# Patient Record
Sex: FEMALE | Race: OTHER
[De-identification: names, ages, dates, MRNs, and addresses within clinical notes are randomized per-mention and may not be internally consistent; named-entity substitution may affect disease eponyms.]

---

## 2017-03-28 ENCOUNTER — HOSPITAL ENCOUNTER (INPATIENT)
Dept: HOSPITAL 61 - 3 SO LND | Age: 37
LOS: 2 days | Discharge: HOME | End: 2017-03-30
Attending: SPECIALIST | Admitting: SPECIALIST
Payer: SELF-PAY

## 2017-03-28 VITALS — DIASTOLIC BLOOD PRESSURE: 77 MMHG | SYSTOLIC BLOOD PRESSURE: 122 MMHG

## 2017-03-28 VITALS — WEIGHT: 164 LBS | BODY MASS INDEX: 35.38 KG/M2 | HEIGHT: 57 IN

## 2017-03-28 VITALS — DIASTOLIC BLOOD PRESSURE: 73 MMHG | SYSTOLIC BLOOD PRESSURE: 117 MMHG

## 2017-03-28 DIAGNOSIS — Z3A.00: ICD-10-CM

## 2017-03-28 LAB
ERYTHROCYTE [DISTWIDTH] IN BLOOD BY AUTOMATED COUNT: 14.2 % (ref 11.5–14.5)
HCT VFR BLD CALC: 31.9 % (ref 36–47)
HGB BLD-MCNC: 10.5 G/DL (ref 12–15.5)
MCH RBC QN AUTO: 29 PG (ref 25–35)
MCHC RBC AUTO-ENTMCNC: 33 G/DL (ref 31–37)
MCV RBC AUTO: 89 FL (ref 79–100)
PLATELET # BLD AUTO: 241 X10^3/UL (ref 140–400)
RBC # BLD AUTO: 3.58 X10^6/UL (ref 3.5–5.4)
WBC # BLD AUTO: 7.8 X10^3/UL (ref 4–11)

## 2017-03-28 PROCEDURE — 85014 HEMATOCRIT: CPT

## 2017-03-28 PROCEDURE — 36415 COLL VENOUS BLD VENIPUNCTURE: CPT

## 2017-03-28 PROCEDURE — 86920 COMPATIBILITY TEST SPIN: CPT

## 2017-03-28 PROCEDURE — 86593 SYPHILIS TEST NON-TREP QUANT: CPT

## 2017-03-28 PROCEDURE — 86901 BLOOD TYPING SEROLOGIC RH(D): CPT

## 2017-03-28 PROCEDURE — 86850 RBC ANTIBODY SCREEN: CPT

## 2017-03-28 PROCEDURE — G0378 HOSPITAL OBSERVATION PER HR: HCPCS

## 2017-03-28 PROCEDURE — 86900 BLOOD TYPING SEROLOGIC ABO: CPT

## 2017-03-28 PROCEDURE — 85027 COMPLETE CBC AUTOMATED: CPT

## 2017-03-28 NOTE — PDOC
VAGINAL DELIVERY


DATE


DATE: 3/28/17 


TIME: 20:03


:  3


Para:  2


EDC:  2017


VAGINAL DELIVERY:  VTX


VACCUM ASSISTED:  Yes


NUMBER OF PULLS


2


PLACENTA:  Spontaneous


APGAR





SEX:  Female


WEIGHT


7/8


Amniotic Fluid:  Clear


PAIN:  Local


EPISIOTOMY:  No


EXTENSION:  Yes


COMPLICATIONS


None


CONDITION


Stable


Signs of Intrauterine Infectio:  None


Shoulder Dystocia:  No


DIAGNOSIS


TIUP del


Problems:  








MUSHTAQ POTTS MD Mar 28, 2017 20:05

## 2017-03-29 VITALS — SYSTOLIC BLOOD PRESSURE: 102 MMHG | DIASTOLIC BLOOD PRESSURE: 63 MMHG

## 2017-03-29 VITALS — SYSTOLIC BLOOD PRESSURE: 97 MMHG | DIASTOLIC BLOOD PRESSURE: 58 MMHG

## 2017-03-29 VITALS — DIASTOLIC BLOOD PRESSURE: 72 MMHG | SYSTOLIC BLOOD PRESSURE: 115 MMHG

## 2017-03-29 VITALS — SYSTOLIC BLOOD PRESSURE: 103 MMHG | DIASTOLIC BLOOD PRESSURE: 68 MMHG

## 2017-03-29 VITALS — SYSTOLIC BLOOD PRESSURE: 123 MMHG | DIASTOLIC BLOOD PRESSURE: 74 MMHG

## 2017-03-29 RX ADMIN — IBUPROFEN PRN MG: 800 TABLET ORAL at 17:33

## 2017-03-29 RX ADMIN — FERROUS SULFATE TAB 325 MG (65 MG ELEMENTAL FE) SCH MG: 325 (65 FE) TAB at 17:32

## 2017-03-29 RX ADMIN — IBUPROFEN PRN MG: 800 TABLET ORAL at 08:43

## 2017-03-29 RX ADMIN — DOCUSATE SODIUM PRN MG: 100 CAPSULE, LIQUID FILLED ORAL at 08:42

## 2017-03-29 RX ADMIN — FERROUS SULFATE TAB 325 MG (65 MG ELEMENTAL FE) SCH MG: 325 (65 FE) TAB at 08:42

## 2017-03-29 RX ADMIN — HYDROCODONE BITARTRATE AND ACETAMINOPHEN PRN TAB: 5; 325 TABLET ORAL at 13:34

## 2017-03-29 RX ADMIN — HYDROCODONE BITARTRATE AND ACETAMINOPHEN PRN TAB: 5; 325 TABLET ORAL at 00:22

## 2017-03-29 NOTE — PDOC
OB Progress Note


Date of Service


3/29/17


Time of Evaluation





Notes


Pt. feeling well.  Lochia minimal.  Pain controlled.


Lab





Laboratory Tests








Test


  3/28/17


17:00 3/29/17


03:55


 


White Blood Count


  7.8x10^3/uL


(4.0-11.0) 


 


 


Red Blood Count


  3.58x10^6/uL


(3.50-5.40) 


 


 


Hemoglobin


  10.5g/dL


(12.0-15.5) 


 


 


Hematocrit


  31.9%


(36.0-47.0) 29.5%


(36.0-47.0)


 


Mean Corpuscular Volume 89fL ()  


 


Mean Corpuscular Hemoglobin 29pg (25-35)  


 


Mean Corpuscular Hemoglobin


Concent 33g/dL (31-37) 


  


 


 


Red Cell Distribution Width


  14.2%


(11.5-14.5) 


 


 


Platelet Count


  241x10^3/uL


(140-400) 


 


 


RPR Titer Additional Testing


  Non reactive


(Non Reactive) 


 








Laboratory Tests








Test


  3/29/17


03:55


 


Hematocrit


  29.5%


(36.0-47.0)








Medications





 Current Medications


Sodium Chloride 3 ml 3 ml QSHIFT  PRN IV AFTER MEDS AND BLOOD DRAWS;  Start 3/28

/17 at 17:30;  Stop 3/28/17 at 23:03;  Status DC


Lactated Ringer's (Iv Lactated Ringers) 1,000 ml @  125 mls/hr Q8H IV  Last 

administered on 3/28/17at 18:23;  Start 3/28/17 at 18:00;  Stop 3/28/17 at 23:03

;  Status DC


Butorphanol Tartrate (Stadol) 2 mg PRN Q1HR  PRN IV Severe labor pain;  Start 3/

28/17 at 17:30;  Stop 3/28/17 at 23:03;  Status DC


Fentanyl Citrate (Fentanyl 2ml Vial) 100 mcg PRN Q30MIN  PRN IV Severe pain 

Last administered on 3/28/17at 18:22;  Start 3/28/17 at 17:30;  Stop 3/28/17 at 

23:03;  Status DC


Ondansetron HCl (Zofran) 4 mg PRN Q4HRS  PRN IV NAUSEA/VOMITING;  Start 3/28/17 

at 17:30


Terbutaline Sulfate (Brethine) 0.25 mg 1X PRN  PRN SQ SEE COMMENTS;  Start 3/28/

17 at 17:30;  Stop 3/28/17 at 23:03;  Status DC


Lidocaine HCl 30 ml 30 ml 1X PRN  PRN INJ SEE COMMENTS Last administered on 3/28

/17at 20:00;  Start 3/28/17 at 17:30;  Stop 3/28/17 at 23:03;  Status DC


Oxytocin/Sodium Chloride 500 ml @ 0 mls/hr CONT  PRN IV SEE I/O RECORD Last 

administered on 3/28/17at 18:23;  Start 3/28/17 at 17:30;  Stop 3/28/17 at 23:03

;  Status DC


Oxytocin/Sodium Chloride (Oxytocin Premix Infusion) 500 ml @ 0 mls/hr CONT PRN  

PRN IV Post delivery bleeding;  Start 3/28/17 at 17:30;  Stop 3/28/17 at 23:03;

  Status DC


Ibuprofen (Motrin) 800 mg PRN Q6HRS  PRN PO PAIN Last administered on 3/28/17at 

21:19;  Start 3/28/17 at 17:30;  Stop 3/28/17 at 23:03;  Status DC


Sodium Chloride 10 ml 10 ml QSHIFT  PRN IV AFTER MEDS AND BLOOD DRAWS;  Start 3/

28/17 at 23:00


Oxytocin/Sodium Chloride (Oxytocin Premix Infusion) 500 ml @  62.5 mls/hr CONT  

PRN IV SEE I/O RECORD;  Start 3/28/17 at 23:00;  Stop 3/29/17 at 06:59;  Status 

DC


Acetaminophen (Tylenol) 650 mg PRN Q6HRS  PRN PO MILD PAIN / TEMP;  Start 3/28/

17 at 23:00


Ibuprofen (Motrin) 800 mg PRN Q8HRS  PRN PO INFLAMMATION/PAIN PREVENTION Last 

administered on 3/29/17at 17:33;  Start 3/28/17 at 23:00


Docusate Sodium (Colace) 100 mg PRN BID  PRN PO CONSTIPATION Last administered 

on 3/29/17at 08:42;  Start 3/28/17 at 23:00


Magnesium Hydroxide (Milk Of Magnesia) 2,400 mg PRN DAILY  PRN PO CONSTIPATION;

  Start 3/28/17 at 23:00


Al Hydroxide/Mg Hydroxide (Mylanta Plus Xs) 30 ml PRN Q4HRS  PRN PO HEARTBURN / 

GAS;  Start 3/28/17 at 23:00


Simethicone (Gas-X) 80 mg PRN AFTMEALHC  PRN PO GAS / BLOATING;  Start 3/28/17 

at 23:00


Diphenhydramine HCl (Benadryl) 25 mg PRN Q6HRS  PRN PO ITCHING;  Start 3/28/17 

at 23:00


Benzocaine (Americaine) 1 spray PRN QID  PRN TP TOPICAL PAIN Last administered 

on 3/29/17at 00:17;  Start 3/28/17 at 23:00


Phenyleph/Shark Oil/Min Oil/Petrol (Preparation H) 1 arnoldo PRN QID  PRN RC RECTAL 

PAIN;  Start 3/28/17 at 23:00


Hydrocortisone (Cortaid) 1 arnoldo PRN QID  PRN TP PERINEAL PAIN;  Start 3/28/17 at 

23:00


Ferrous Sulfate (Feosol) 325 mg BIDWMEALS PO  Last administered on 3/29/17at 17:

32;  Start 3/29/17 at 08:00


Zolpidem Tartrate (Ambien) 5 mg PRN QHS  PRN PO INSOMNIA, MAY REPEAT X1;  Start 

3/28/17 at 23:00


Info (Do NOT chart on this placeholder) 1 ea 1X PRN  PRN MC SEE COMMENTS;  

Start 3/28/17 at 23:00


Info (Do NOT chart on this placeholder) 1 ea 1X PRN  PRN MC SEE COMMENTS;  

Start 3/28/17 at 23:00


Acetaminophen/ Hydrocodone Bitart (Lortab 5/325) 1 tab PRN Q4HRS  PRN PO MILD 

PAIN;  Start 3/28/17 at 23:00


Acetaminophen/ Hydrocodone Bitart (Lortab 5/325) 2 tab PRN Q4HRS  PRN PO 

MODERATE PAIN, SEVERE PAIN Last administered on 3/29/17at 13:34;  Start 3/28/17 

at 23:00





Active Scripts


Active


Reported


Prenatal Tablet (Prenatal Vit/Iron Fumarate/Fa) 1 Each Tablet 1 Each PO DAILY


Exam


Abd: soft, non tender, fundus firm


Assessment


PPD#1 s/p 


Plan of Care:  Continue current Tx, Mgmt








DEEPA SALEH Jr, MD Mar 29, 2017 18:16

## 2017-03-30 VITALS — DIASTOLIC BLOOD PRESSURE: 74 MMHG | SYSTOLIC BLOOD PRESSURE: 112 MMHG

## 2017-03-30 VITALS
DIASTOLIC BLOOD PRESSURE: 74 MMHG | SYSTOLIC BLOOD PRESSURE: 112 MMHG | SYSTOLIC BLOOD PRESSURE: 112 MMHG | DIASTOLIC BLOOD PRESSURE: 74 MMHG

## 2017-03-30 VITALS — DIASTOLIC BLOOD PRESSURE: 69 MMHG | SYSTOLIC BLOOD PRESSURE: 111 MMHG

## 2017-03-30 VITALS — DIASTOLIC BLOOD PRESSURE: 66 MMHG | SYSTOLIC BLOOD PRESSURE: 104 MMHG

## 2017-03-30 RX ADMIN — DOCUSATE SODIUM PRN MG: 100 CAPSULE, LIQUID FILLED ORAL at 08:23

## 2017-03-30 RX ADMIN — HYDROCODONE BITARTRATE AND ACETAMINOPHEN PRN TAB: 5; 325 TABLET ORAL at 08:24

## 2017-03-30 RX ADMIN — IBUPROFEN PRN MG: 800 TABLET ORAL at 06:33

## 2017-03-30 RX ADMIN — FERROUS SULFATE TAB 325 MG (65 MG ELEMENTAL FE) SCH MG: 325 (65 FE) TAB at 08:23

## 2017-03-30 NOTE — PDOC
OB Progress Note


Date of Service


3/30/17


Time of Evaluation


1730


Notes


Pt. feeling well. No complaints.


Lab





Laboratory Tests








Test


  3/29/17


03:55


 


Hematocrit


  29.5%


(36.0-47.0)








Medications





 Current Medications


Sodium Chloride 3 ml 3 ml QSHIFT  PRN IV AFTER MEDS AND BLOOD DRAWS;  Start 3/28

/17 at 17:30;  Stop 3/28/17 at 23:03;  Status DC


Lactated Ringer's (Iv Lactated Ringers) 1,000 ml @  125 mls/hr Q8H IV  Last 

administered on 3/28/17at 18:23;  Start 3/28/17 at 18:00;  Stop 3/28/17 at 23:03

;  Status DC


Butorphanol Tartrate (Stadol) 2 mg PRN Q1HR  PRN IV Severe labor pain;  Start 3/

28/17 at 17:30;  Stop 3/28/17 at 23:03;  Status DC


Fentanyl Citrate (Fentanyl 2ml Vial) 100 mcg PRN Q30MIN  PRN IV Severe pain 

Last administered on 3/28/17at 18:22;  Start 3/28/17 at 17:30;  Stop 3/28/17 at 

23:03;  Status DC


Ondansetron HCl (Zofran) 4 mg PRN Q4HRS  PRN IV NAUSEA/VOMITING;  Start 3/28/17 

at 17:30;  Stop 3/30/17 at 05:00;  Status DC


Terbutaline Sulfate (Brethine) 0.25 mg 1X PRN  PRN SQ SEE COMMENTS;  Start 3/28/

17 at 17:30;  Stop 3/28/17 at 23:03;  Status DC


Lidocaine HCl 30 ml 30 ml 1X PRN  PRN INJ SEE COMMENTS Last administered on 3/28

/17at 20:00;  Start 3/28/17 at 17:30;  Stop 3/28/17 at 23:03;  Status DC


Oxytocin/Sodium Chloride 500 ml @ 0 mls/hr CONT  PRN IV SEE I/O RECORD Last 

administered on 3/28/17at 18:23;  Start 3/28/17 at 17:30;  Stop 3/28/17 at 23:03

;  Status DC


Oxytocin/Sodium Chloride (Oxytocin Premix Infusion) 500 ml @ 0 mls/hr CONT PRN  

PRN IV Post delivery bleeding;  Start 3/28/17 at 17:30;  Stop 3/28/17 at 23:03;

  Status DC


Ibuprofen (Motrin) 800 mg PRN Q6HRS  PRN PO PAIN Last administered on 3/28/17at 

21:19;  Start 3/28/17 at 17:30;  Stop 3/28/17 at 23:03;  Status DC


Sodium Chloride 10 ml 10 ml QSHIFT  PRN IV AFTER MEDS AND BLOOD DRAWS;  Start 3/

28/17 at 23:00


Oxytocin/Sodium Chloride (Oxytocin Premix Infusion) 500 ml @  62.5 mls/hr CONT  

PRN IV SEE I/O RECORD;  Start 3/28/17 at 23:00;  Stop 3/29/17 at 06:59;  Status 

DC


Acetaminophen (Tylenol) 650 mg PRN Q6HRS  PRN PO MILD PAIN / TEMP;  Start 3/28/

17 at 23:00;  Stop 3/30/17 at 05:00;  Status DC


Ibuprofen (Motrin) 800 mg PRN Q8HRS  PRN PO INFLAMMATION/PAIN PREVENTION Last 

administered on 3/30/17at 06:33;  Start 3/28/17 at 23:00


Docusate Sodium (Colace) 100 mg PRN BID  PRN PO CONSTIPATION Last administered 

on 3/30/17at 08:23;  Start 3/28/17 at 23:00


Magnesium Hydroxide (Milk Of Magnesia) 2,400 mg PRN DAILY  PRN PO CONSTIPATION;

  Start 3/28/17 at 23:00


Al Hydroxide/Mg Hydroxide (Mylanta Plus Xs) 30 ml PRN Q4HRS  PRN PO HEARTBURN / 

GAS;  Start 3/28/17 at 23:00


Simethicone (Gas-X) 80 mg PRN AFTMEALHC  PRN PO GAS / BLOATING;  Start 3/28/17 

at 23:00


Diphenhydramine HCl (Benadryl) 25 mg PRN Q6HRS  PRN PO ITCHING;  Start 3/28/17 

at 23:00


Benzocaine (Americaine) 1 spray PRN QID  PRN TP TOPICAL PAIN Last administered 

on 3/29/17at 00:17;  Start 3/28/17 at 23:00


Phenyleph/Shark Oil/Min Oil/Petrol (Preparation H) 1 arnoldo PRN QID  PRN RC RECTAL 

PAIN;  Start 3/28/17 at 23:00


Hydrocortisone (Cortaid) 1 arnoldo PRN QID  PRN TP PERINEAL PAIN;  Start 3/28/17 at 

23:00


Ferrous Sulfate (Feosol) 325 mg BIDWMEALS PO  Last administered on 3/30/17at 08:

23;  Start 3/29/17 at 08:00


Zolpidem Tartrate (Ambien) 5 mg PRN QHS  PRN PO INSOMNIA, MAY REPEAT X1;  Start 

3/28/17 at 23:00


Info (Do NOT chart on this placeholder) 1 ea 1X PRN  PRN MC SEE COMMENTS;  

Start 3/28/17 at 23:00


Info (Do NOT chart on this placeholder) 1 ea 1X PRN  PRN MC SEE COMMENTS;  

Start 3/28/17 at 23:00


Acetaminophen/ Hydrocodone Bitart (Lortab 5/325) 1 tab PRN Q4HRS  PRN PO MILD 

PAIN;  Start 3/28/17 at 23:00


Acetaminophen/ Hydrocodone Bitart (Lortab 5/325) 2 tab PRN Q4HRS  PRN PO 

MODERATE PAIN, SEVERE PAIN Last administered on 3/30/17at 08:24;  Start 3/28/17 

at 23:00


Acetaminophen (Tylenol) 650 mg PRN Q6HRS  PRN PO MILD PAIN / TEMP;  Start 3/30/

17 at 05:00


Ondansetron HCl (Zofran) 4 mg PRN Q4HRS  PRN IV NAUSEA/VOMITING;  Start 3/30/17 

at 05:00





Active Scripts


Active


Reported


Prenatal Tablet (Prenatal Vit/Iron Fumarate/Fa) 1 Each Tablet 1 Each PO DAILY


Exam


Abd: soft, non tender, fundus firm


Assessment


PPD#2 s/p 


Plan of Care:  See new orders (D/c home.)








DEEPA SALEH Jr, MD Mar 30, 2017 17:32

## 2017-03-30 NOTE — DISCH
DISCHARGE INSTRUCTIONS


Condition on Discharge


Condition on Discharge:  Stable





Activity After Discharge


Activity Instructions for Disc:  Activity as tolerated


Lifting Instructions after Dis:  No heavy lifting


Driving Instructions after Dis:  Do not drive today





Diet after Discharge


Diet after Discharge:  Regular





Contacting the DRNancy after DC


Call your doctor for:  Concerns you may have





Follow-Up


Follow up with:  Dr. Chambers in 1 week.








DEEPA SALEH Jr, MD Mar 30, 2017 17:32

## 2019-07-11 ENCOUNTER — HOSPITAL ENCOUNTER (OUTPATIENT)
Dept: HOSPITAL 61 - 3 SO LND | Age: 39
Setting detail: OBSERVATION
Discharge: HOME | End: 2019-07-11
Attending: OBSTETRICS & GYNECOLOGY | Admitting: OBSTETRICS & GYNECOLOGY
Payer: SELF-PAY

## 2019-07-11 VITALS — WEIGHT: 165 LBS | BODY MASS INDEX: 33.26 KG/M2 | HEIGHT: 59 IN

## 2019-07-11 DIAGNOSIS — O46.93: Primary | ICD-10-CM

## 2019-07-11 DIAGNOSIS — Z3A.30: ICD-10-CM

## 2019-07-11 LAB
BASOPHILS # BLD AUTO: 0 X10^3/UL (ref 0–0.2)
BASOPHILS NFR BLD: 0 % (ref 0–3)
EOSINOPHIL NFR BLD: 0.1 X10^3/UL (ref 0–0.7)
EOSINOPHIL NFR BLD: 1 % (ref 0–3)
ERYTHROCYTE [DISTWIDTH] IN BLOOD BY AUTOMATED COUNT: 13.9 % (ref 11.5–14.5)
HCT VFR BLD CALC: 38.8 % (ref 36–47)
HGB BLD-MCNC: 13.2 G/DL (ref 12–15.5)
LYMPHOCYTES # BLD: 1.6 X10^3/UL (ref 1–4.8)
LYMPHOCYTES NFR BLD AUTO: 24 % (ref 24–48)
MCH RBC QN AUTO: 32 PG (ref 25–35)
MCHC RBC AUTO-ENTMCNC: 34 G/DL (ref 31–37)
MCV RBC AUTO: 94 FL (ref 79–100)
MONO #: 0.4 X10^3/UL (ref 0–1.1)
MONOCYTES NFR BLD: 7 % (ref 0–9)
NEUT #: 4.5 X10^3/UL (ref 1.8–7.7)
NEUTROPHILS NFR BLD AUTO: 68 % (ref 31–73)
PLATELET # BLD AUTO: 257 X10^3/UL (ref 140–400)
RBC # BLD AUTO: 4.14 X10^6/UL (ref 3.5–5.4)
WBC # BLD AUTO: 6.6 X10^3/UL (ref 4–11)

## 2019-07-11 PROCEDURE — G0378 HOSPITAL OBSERVATION PER HR: HCPCS

## 2019-07-11 PROCEDURE — 86901 BLOOD TYPING SEROLOGIC RH(D): CPT

## 2019-07-11 PROCEDURE — G0379 DIRECT REFER HOSPITAL OBSERV: HCPCS

## 2019-07-11 PROCEDURE — 76805 OB US >/= 14 WKS SNGL FETUS: CPT

## 2019-07-11 PROCEDURE — 86850 RBC ANTIBODY SCREEN: CPT

## 2019-07-11 PROCEDURE — 86900 BLOOD TYPING SEROLOGIC ABO: CPT

## 2019-07-11 PROCEDURE — 36415 COLL VENOUS BLD VENIPUNCTURE: CPT

## 2019-07-11 PROCEDURE — 85025 COMPLETE CBC W/AUTO DIFF WBC: CPT

## 2019-07-11 PROCEDURE — 96372 THER/PROPH/DIAG INJ SC/IM: CPT

## 2019-07-11 NOTE — RAD
OB ultrasound greater than 14 weeks 7/11/2019

 

Clinical History: Third trimester pregnancy with vaginal bleeding.

 

Technique: A real-time ultrasound examination of the gravid uterus was 

performed. Multiple images were obtained.

 

Findings: There is a single living IUP. The fetus is in a cephalic 

position. Fetal cardiac and somatic activity is seen. The fetal heart rate

is 132 beats per minutes. The placenta is anterior. No significant 

abnormality of the placenta is seen. No abnormality is seen. The amniotic 

fluid volume is within the lower limits of normal. The CARMELINA measures 5.4 

cm. The maternal cervix is not well visualized due to the position of the 

fetal head. Neither maternal ovary is visualized.

 

The following fetal measurements were obtained:

 

BPD 8.1cm 32 weeks 3 days

 

HC 28.6 cm 31weeks 3 days

 

AC 27.3 cm 31weeks to days

 

FL 6.0 cm 31 weeks to days

 

The estimated gestational age by ultrasound is 31 weeks 4 days plus or 

minus a standard deviation of 3 weeks. The estimated date of delivery by 

ultrasound is 9/8/2019. The estimated fetal weight is 1761 g +/- 261 g (3 

lbs. 14 oz.)

 

 

No fetal abnormality is seen. Specifically the fetal stomach, bladder, 

kidneys, 3 vessel cord and four-chamber heart are well-visualized and 

within normal limits. The fetal brain is not well evaluated due to fetal 

position.

 

Impression: Single living IUP with an estimated gestational age by 

ultrasound of 31 weeks 4 days +/- a standard deviation of 3 weeks. The 

estimated fetal weight is 1761 g +/- 261 g (3 lbs. 14 oz.)

 

Electronically signed by: Ray Ordaz MD (7/11/2019 11:04 AM) [facility]

## 2019-08-15 ENCOUNTER — HOSPITAL ENCOUNTER (INPATIENT)
Dept: HOSPITAL 61 - 3 SO LND | Age: 39
LOS: 5 days | Discharge: HOME | End: 2019-08-20
Attending: SPECIALIST | Admitting: SPECIALIST
Payer: SELF-PAY

## 2019-08-15 VITALS — DIASTOLIC BLOOD PRESSURE: 70 MMHG | SYSTOLIC BLOOD PRESSURE: 114 MMHG

## 2019-08-15 VITALS — WEIGHT: 167 LBS | BODY MASS INDEX: 33.67 KG/M2 | HEIGHT: 59 IN

## 2019-08-15 DIAGNOSIS — O41.03X0: ICD-10-CM

## 2019-08-15 DIAGNOSIS — O34.211: Primary | ICD-10-CM

## 2019-08-15 DIAGNOSIS — Z3A.36: ICD-10-CM

## 2019-08-15 LAB
ALBUMIN SERPL-MCNC: 2.6 G/DL (ref 3.4–5)
ALBUMIN/GLOB SERPL: 0.6 {RATIO} (ref 1–1.7)
ALP SERPL-CCNC: 134 U/L (ref 46–116)
ALT SERPL-CCNC: 20 U/L (ref 14–59)
ANION GAP SERPL CALC-SCNC: 11 MMOL/L (ref 6–14)
APTT PPP: (no result) S
AST SERPL-CCNC: 19 U/L (ref 15–37)
BACTERIA #/AREA URNS HPF: (no result) /HPF
BASOPHILS # BLD AUTO: 0.1 X10^3/UL (ref 0–0.2)
BASOPHILS NFR BLD: 1 % (ref 0–3)
BILIRUB SERPL-MCNC: 0.4 MG/DL (ref 0.2–1)
BILIRUB UR QL STRIP: NEGATIVE
BUN SERPL-MCNC: 9 MG/DL (ref 7–20)
BUN/CREAT SERPL: 15 (ref 6–20)
CALCIUM SERPL-MCNC: 8.9 MG/DL (ref 8.5–10.1)
CHLORIDE SERPL-SCNC: 103 MMOL/L (ref 98–107)
CO2 SERPL-SCNC: 22 MMOL/L (ref 21–32)
CREAT SERPL-MCNC: 0.6 MG/DL (ref 0.6–1)
EOSINOPHIL NFR BLD: 0 X10^3/UL (ref 0–0.7)
EOSINOPHIL NFR BLD: 1 % (ref 0–3)
ERYTHROCYTE [DISTWIDTH] IN BLOOD BY AUTOMATED COUNT: 13.9 % (ref 11.5–14.5)
FIBRINOGEN PPP-MCNC: CLEAR MG/DL
GFR SERPLBLD BASED ON 1.73 SQ M-ARVRAT: 111.3 ML/MIN
GLOBULIN SER-MCNC: 4.3 G/DL (ref 2.2–3.8)
GLUCOSE SERPL-MCNC: 140 MG/DL (ref 70–99)
HCT VFR BLD CALC: 36.4 % (ref 36–47)
HGB BLD-MCNC: 12.4 G/DL (ref 12–15.5)
LYMPHOCYTES # BLD: 1.6 X10^3/UL (ref 1–4.8)
LYMPHOCYTES NFR BLD AUTO: 21 % (ref 24–48)
MCH RBC QN AUTO: 31 PG (ref 25–35)
MCHC RBC AUTO-ENTMCNC: 34 G/DL (ref 31–37)
MCV RBC AUTO: 93 FL (ref 79–100)
MONO #: 0.4 X10^3/UL (ref 0–1.1)
MONOCYTES NFR BLD: 5 % (ref 0–9)
NEUT #: 5.6 X10^3/UL (ref 1.8–7.7)
NEUTROPHILS NFR BLD AUTO: 72 % (ref 31–73)
NITRITE UR QL STRIP: NEGATIVE
PH UR STRIP: 6 [PH]
PLATELET # BLD AUTO: 229 X10^3/UL (ref 140–400)
POTASSIUM SERPL-SCNC: 3.7 MMOL/L (ref 3.5–5.1)
PROT SERPL-MCNC: 6.9 G/DL (ref 6.4–8.2)
PROT UR STRIP-MCNC: 30 MG/DL
RBC # BLD AUTO: 3.94 X10^6/UL (ref 3.5–5.4)
RBC #/AREA URNS HPF: 0 /HPF (ref 0–2)
SODIUM SERPL-SCNC: 136 MMOL/L (ref 136–145)
SQUAMOUS #/AREA URNS LPF: (no result) /LPF
UROBILINOGEN UR-MCNC: 0.2 MG/DL
WBC # BLD AUTO: 7.8 X10^3/UL (ref 4–11)

## 2019-08-15 PROCEDURE — 82962 GLUCOSE BLOOD TEST: CPT

## 2019-08-15 PROCEDURE — 87086 URINE CULTURE/COLONY COUNT: CPT

## 2019-08-15 PROCEDURE — 86901 BLOOD TYPING SEROLOGIC RH(D): CPT

## 2019-08-15 PROCEDURE — 36415 COLL VENOUS BLD VENIPUNCTURE: CPT

## 2019-08-15 PROCEDURE — 81001 URINALYSIS AUTO W/SCOPE: CPT

## 2019-08-15 PROCEDURE — 86850 RBC ANTIBODY SCREEN: CPT

## 2019-08-15 PROCEDURE — 76816 OB US FOLLOW-UP PER FETUS: CPT

## 2019-08-15 PROCEDURE — 86592 SYPHILIS TEST NON-TREP QUAL: CPT

## 2019-08-15 PROCEDURE — 86900 BLOOD TYPING SEROLOGIC ABO: CPT

## 2019-08-15 PROCEDURE — 80053 COMPREHEN METABOLIC PANEL: CPT

## 2019-08-15 PROCEDURE — 85025 COMPLETE CBC W/AUTO DIFF WBC: CPT

## 2019-08-15 PROCEDURE — G0379 DIRECT REFER HOSPITAL OBSERV: HCPCS

## 2019-08-15 PROCEDURE — 92585: CPT

## 2019-08-15 PROCEDURE — 84443 ASSAY THYROID STIM HORMONE: CPT

## 2019-08-15 PROCEDURE — G0378 HOSPITAL OBSERVATION PER HR: HCPCS

## 2019-08-15 RX ADMIN — BETAMETHASONE SODIUM PHOSPHATE AND BETAMETHASONE ACETATE SCH MG: 3; 3 INJECTION, SUSPENSION INTRA-ARTICULAR; INTRALESIONAL; INTRAMUSCULAR at 13:20

## 2019-08-15 RX ADMIN — SODIUM CHLORIDE, SODIUM LACTATE, POTASSIUM CHLORIDE, AND CALCIUM CHLORIDE SCH MLS/HR: .6; .31; .03; .02 INJECTION, SOLUTION INTRAVENOUS at 17:38

## 2019-08-15 RX ADMIN — SODIUM CHLORIDE, SODIUM LACTATE, POTASSIUM CHLORIDE, AND CALCIUM CHLORIDE SCH MLS/HR: .6; .31; .03; .02 INJECTION, SOLUTION INTRAVENOUS at 13:19

## 2019-08-15 RX ADMIN — SODIUM CHLORIDE, SODIUM LACTATE, POTASSIUM CHLORIDE, AND CALCIUM CHLORIDE SCH MLS/HR: .6; .31; .03; .02 INJECTION, SOLUTION INTRAVENOUS at 20:41

## 2019-08-16 RX ADMIN — BETAMETHASONE SODIUM PHOSPHATE AND BETAMETHASONE ACETATE SCH MG: 3; 3 INJECTION, SUSPENSION INTRA-ARTICULAR; INTRALESIONAL; INTRAMUSCULAR at 13:51

## 2019-08-16 RX ADMIN — SODIUM CHLORIDE, SODIUM LACTATE, POTASSIUM CHLORIDE, AND CALCIUM CHLORIDE SCH MLS/HR: .6; .31; .03; .02 INJECTION, SOLUTION INTRAVENOUS at 02:03

## 2019-08-16 RX ADMIN — SODIUM CHLORIDE, SODIUM LACTATE, POTASSIUM CHLORIDE, AND CALCIUM CHLORIDE SCH MLS/HR: .6; .31; .03; .02 INJECTION, SOLUTION INTRAVENOUS at 08:21

## 2019-08-16 RX ADMIN — SODIUM CHLORIDE, SODIUM LACTATE, POTASSIUM CHLORIDE, AND CALCIUM CHLORIDE SCH MLS/HR: .6; .31; .03; .02 INJECTION, SOLUTION INTRAVENOUS at 20:41

## 2019-08-16 RX ADMIN — SODIUM CHLORIDE, SODIUM LACTATE, POTASSIUM CHLORIDE, AND CALCIUM CHLORIDE SCH MLS/HR: .6; .31; .03; .02 INJECTION, SOLUTION INTRAVENOUS at 04:41

## 2019-08-16 RX ADMIN — SODIUM CHLORIDE, SODIUM LACTATE, POTASSIUM CHLORIDE, AND CALCIUM CHLORIDE SCH MLS/HR: .6; .31; .03; .02 INJECTION, SOLUTION INTRAVENOUS at 12:41

## 2019-08-16 RX ADMIN — SODIUM CHLORIDE, SODIUM LACTATE, POTASSIUM CHLORIDE, AND CALCIUM CHLORIDE SCH MLS/HR: .6; .31; .03; .02 INJECTION, SOLUTION INTRAVENOUS at 18:04

## 2019-08-16 NOTE — PDOC1
OB - History


Hx of Present Pregnancy


Prenatal Care:  Good Care


Ultrasounds:  Normal mid trimester US (oligo), Abnormal US findings


Obstetrical Complications:  Other (oigo)


Medical Complications:  None





Past Family/Social History


*


Past Medical, Surgical, Family and Obstetric Histories reviewed from prenatal 

chart.


Blood Type:  O+


RPR/VDRL:  Negative


GBS Status:  Unknown


HBsAG:  Negative





OB - Chief Complaint & HPI


Date of Admission:


Date of Admission:  Aug 15, 2019 at 12:26


Chief Complaint/History


:  5


Para:  3


EDC:  Sep 14, 2019


Reason for admission:  observation, other (oligo)


Admission Nurse Assessment Rev:  Yes





OB - Admission Exam


Physical Exam


Vitals:





                          VS - Last 72 Hours, by Label








  Date Time  Temp Pulse Resp B/P (MAP) Pulse Ox O2 Delivery O2 Flow Rate FiO2


 


8/15/19 13:29 98.0 78 20 114/70 (85)  Room Air  





 98.0       








HEENT:  Normal, Nasal Mucosa Normal, Oropharynx Normal, Moist Membranes, 

Fontanelles Normal


Heart:  Regular Rate


Lungs:  Clear, Equal


Abdomen:  Gravid


Extremities:  Normal Pulses, No tenderness or swelling


Reflexes:  Normal


Cervical Dilatation:  3cm


Effacement:  75%


Station:  Ballotable


Membranes:  Intact


Fetal Heart Rate:  Normal


Accelerations:  Accelerations Present


Short Term Variability:  Present





Assessment/Plan


Assessment/Plan


TIUP


Oligo


Observation


Continuous Monitoring


Steroids


Recheck CARMELINA


Saturday











MUSHTAQ POTTS MD               Aug 16, 2019 13:06

## 2019-08-16 NOTE — PDOC
Provider Note


Provider Note


Stable


FHT reassuring


occasional Ctx


FU in AM











UMSHTAQ POTTS MD               Aug 16, 2019 13:07

## 2019-08-17 VITALS — DIASTOLIC BLOOD PRESSURE: 62 MMHG | SYSTOLIC BLOOD PRESSURE: 100 MMHG

## 2019-08-17 VITALS — SYSTOLIC BLOOD PRESSURE: 118 MMHG | DIASTOLIC BLOOD PRESSURE: 76 MMHG

## 2019-08-17 LAB
BASOPHILS # BLD AUTO: 0 X10^3/UL (ref 0–0.2)
BASOPHILS NFR BLD: 0 % (ref 0–3)
EOSINOPHIL NFR BLD: 0 % (ref 0–3)
EOSINOPHIL NFR BLD: 0 X10^3/UL (ref 0–0.7)
ERYTHROCYTE [DISTWIDTH] IN BLOOD BY AUTOMATED COUNT: 13.8 % (ref 11.5–14.5)
HCT VFR BLD CALC: 33.2 % (ref 36–47)
HGB BLD-MCNC: 11.2 G/DL (ref 12–15.5)
LYMPHOCYTES # BLD: 1.7 X10^3/UL (ref 1–4.8)
LYMPHOCYTES NFR BLD AUTO: 19 % (ref 24–48)
MCH RBC QN AUTO: 31 PG (ref 25–35)
MCHC RBC AUTO-ENTMCNC: 34 G/DL (ref 31–37)
MCV RBC AUTO: 93 FL (ref 79–100)
MONO #: 0.4 X10^3/UL (ref 0–1.1)
MONOCYTES NFR BLD: 5 % (ref 0–9)
NEUT #: 6.8 X10^3/UL (ref 1.8–7.7)
NEUTROPHILS NFR BLD AUTO: 77 % (ref 31–73)
PLATELET # BLD AUTO: 188 X10^3/UL (ref 140–400)
RBC # BLD AUTO: 3.57 X10^6/UL (ref 3.5–5.4)
WBC # BLD AUTO: 8.9 X10^3/UL (ref 4–11)

## 2019-08-17 RX ADMIN — KETOROLAC TROMETHAMINE PRN MG: 30 INJECTION, SOLUTION INTRAMUSCULAR at 19:35

## 2019-08-17 RX ADMIN — CEFAZOLIN SCH GM: 330 INJECTION, POWDER, FOR SOLUTION INTRAMUSCULAR; INTRAVENOUS at 22:01

## 2019-08-17 RX ADMIN — SODIUM CHLORIDE, SODIUM LACTATE, POTASSIUM CHLORIDE, AND CALCIUM CHLORIDE SCH MLS/HR: .6; .31; .03; .02 INJECTION, SOLUTION INTRAVENOUS at 20:41

## 2019-08-17 RX ADMIN — SODIUM CHLORIDE, SODIUM LACTATE, POTASSIUM CHLORIDE, AND CALCIUM CHLORIDE SCH MLS/HR: .6; .31; .03; .02 INJECTION, SOLUTION INTRAVENOUS at 04:41

## 2019-08-17 RX ADMIN — IBUPROFEN SCH MG: 400 TABLET ORAL at 22:00

## 2019-08-17 RX ADMIN — SODIUM CHLORIDE, SODIUM LACTATE, POTASSIUM CHLORIDE, AND CALCIUM CHLORIDE SCH MLS/HR: .6; .31; .03; .02 INJECTION, SOLUTION INTRAVENOUS at 01:30

## 2019-08-17 RX ADMIN — Medication SCH MG: at 17:00

## 2019-08-17 RX ADMIN — SODIUM CHLORIDE, SODIUM LACTATE, POTASSIUM CHLORIDE, AND CALCIUM CHLORIDE SCH MLS/HR: .6; .31; .03; .02 INJECTION, SOLUTION INTRAVENOUS at 14:32

## 2019-08-17 RX ADMIN — SODIUM CHLORIDE, SODIUM LACTATE, POTASSIUM CHLORIDE, AND CALCIUM CHLORIDE SCH MLS/HR: .6; .31; .03; .02 INJECTION, SOLUTION INTRAVENOUS at 12:41

## 2019-08-17 NOTE — PDOC
BRIEF OPERATIVE NOTE


Date:  Aug 17, 2019


Pre-Op Diagnosis


36/3 IUP


Oligo


Previous C/S


Post-Op Diagnosis


Same


Procedure Performed


RLTC/S


Surgeon


Trish


Assistant


None


Anesthesia Type:  Regional


Blood Loss


800cc                               CBC - BMP


8/17/19 12:21








Findings


Male


8/9/9


Complications


None











MUSHTAQ POTTS MD               Aug 17, 2019 15:59

## 2019-08-17 NOTE — RAD
CLINICAL HISTORY: Ultrasound OB limited 

 

COMPARISON: None available.

 

TECHNIQUE: Limited transabdominal ultrasound of the uterus was performed.

 

FINDINGS: 

There is a single live fetus in cephalic position. Cardiac activity is 

visualized and documented at a rate of 144 beats per minute.

 

The placenta is anterior without placenta previa.  The amniotic fluid is 

decreased, the amniotic fluid index is 2.6 centimeters.

 

Current fetal measurements are:

 

BPD - 9.06 cm = 36 weeks 5 days

HC - 32.26 cm = 36 weeks 3 days

AC - 33.57 cm =37 weeks 3 day

FL - 6.87 cm = 35 weeks 2 days

 

The gestational size based on todays measurements is 36 weeks 3 days.   

 

The estimated fetal weight is 3021+/- 447 gm. This is at the 48 percentile

for the expected gestational age.

 

All measured ratios and indices are within normal limits.

 

The estimated date of delivery is 9/11/2019.

 

IMPRESSION: 

1.  Single live intrauterine gestation in cephalic position with heart 

rate of 144 bpm.

2.  Amniotic fluid is low with amniotic fluid index measuring 2.6 cm.

 

Electronically signed by: Sherman Garduno MD (8/17/2019 10:20 AM) [facility]

## 2019-08-17 NOTE — OP
DATE OF SURGERY:  2019



PREOPERATIVE DIAGNOSES:  A 36- and 3-week intrauterine pregnancy with

oligohydramnios and previous  section.



POSTOPERATIVE DIAGNOSES:  A 36- and 3-week intrauterine pregnancy with

oligohydramnios and previous  section.



PROCEDURE:  Repeat low transverse .



SURGEON:  Jerardo Chambers MD



ASSISTANT:  None.



ANESTHESIA:  Regional.



ESTIMATED BLOOD LOSS:  800 mL.



FLUIDS:  Crystalloid.



SPECIMENS:  None.



FINDINGS:  Male infant; Apgars 8, 9 and 9; weight 6 pounds 7 ounces.  Normal

uterus, tubes and ovaries.



COMPLICATIONS:  None.



CONDITION:  Stable.



DESCRIPTION OF PROCEDURE:  After risks, benefits, indications, alternatives were

discussed with the patient and the patient's daughter, the patient was brought

to the OR theater, placed in supine position.  Previous midline incision was

taken out in toto.  Subcutaneous tissue was carried down with Bovie cautery and

scalpel.  Rectus fascia was nicked in the midline and extended superiorly and

inferiorly with Bovie cautery.  Peritoneum was entered bluntly with gentle

stretch on rectus muscle, room was made for delivery of the infant.  A

low-transverse hysterotomy incision was made sharply with a scalpel with care

not to injure any underlying fetal structures.  The incision was extended

superiorly and laterally with gloved hand.  Membranes were ruptured with Allis

clamps.  Clear fluid was noted.  Gloved hand was placed in the lower uterine

segment, used to elevate fetal head with fundal pressure from the assistant. 

The infant was delivered on to the anterior abdominal wall.  Infant cried

spontaneously and moved all extremities.  The infant was bulb suctioned.  Cord

was doubly clamped, transected cord between the 2 clamps.  The infant was handed

off to special care nursery in attendance.  Cord blood samples were taken. 

Placenta delivered spontaneously intact, 3-vessel cord.  Any adherent membrane

was wiped free with wet laparotomy sponge.  Leif retractor had been previously

placed with sponges in the gutters bilaterally.  Low-transverse hysterotomy

incision was reapproximated with 0 Monocryl in a running locking manner and

imbricated with 0 Monocryl in a vertical mattress stitch fashion.  Good

hemostasis was assured.  Lower uterine segment was hemostatic.  Sponges were

removed from the gutters bilaterally.  Leif retractor was removed.  Gutters

were inspected and noted to be free of any debris or blood.  Appendix was noted

and appeared normal.  Rectus fascia was reapproximated with 0 Stratafix x 2

starting from one upper edge of the fascia, then the lower edge and then

crossing over the middle.  Subcutaneous tissue was irrigated copiously with warm

normal saline.  Skin was reapproximated with Insorb staples and reinforced with

stainless steel staples.  Sponge, needle and instrument counts were correct x 2

per nursing staff.

 



______________________________

JERARDO CHAMBERS MD



DR:  FRANCESCA/doron  JOB#:  [id number] / [id number]

DD:  2019 16:11  DT:  2019 16:41

## 2019-08-18 VITALS — SYSTOLIC BLOOD PRESSURE: 110 MMHG | DIASTOLIC BLOOD PRESSURE: 51 MMHG

## 2019-08-18 VITALS — SYSTOLIC BLOOD PRESSURE: 103 MMHG | DIASTOLIC BLOOD PRESSURE: 63 MMHG

## 2019-08-18 VITALS — DIASTOLIC BLOOD PRESSURE: 66 MMHG | SYSTOLIC BLOOD PRESSURE: 109 MMHG

## 2019-08-18 VITALS — SYSTOLIC BLOOD PRESSURE: 107 MMHG | DIASTOLIC BLOOD PRESSURE: 70 MMHG

## 2019-08-18 LAB
BASOPHILS # BLD AUTO: 0 X10^3/UL (ref 0–0.2)
BASOPHILS NFR BLD: 0 % (ref 0–3)
EOSINOPHIL NFR BLD: 0 % (ref 0–3)
EOSINOPHIL NFR BLD: 0 X10^3/UL (ref 0–0.7)
ERYTHROCYTE [DISTWIDTH] IN BLOOD BY AUTOMATED COUNT: 13.6 % (ref 11.5–14.5)
HCT VFR BLD CALC: 32.3 % (ref 36–47)
HGB BLD-MCNC: 11 G/DL (ref 12–15.5)
LYMPHOCYTES # BLD: 1.7 X10^3/UL (ref 1–4.8)
LYMPHOCYTES NFR BLD AUTO: 18 % (ref 24–48)
MCH RBC QN AUTO: 32 PG (ref 25–35)
MCHC RBC AUTO-ENTMCNC: 34 G/DL (ref 31–37)
MCV RBC AUTO: 93 FL (ref 79–100)
MONO #: 0.7 X10^3/UL (ref 0–1.1)
MONOCYTES NFR BLD: 7 % (ref 0–9)
NEUT #: 7.5 X10^3/UL (ref 1.8–7.7)
NEUTROPHILS NFR BLD AUTO: 76 % (ref 31–73)
PLATELET # BLD AUTO: 169 X10^3/UL (ref 140–400)
RBC # BLD AUTO: 3.48 X10^6/UL (ref 3.5–5.4)
WBC # BLD AUTO: 9.9 X10^3/UL (ref 4–11)

## 2019-08-18 RX ADMIN — KETOROLAC TROMETHAMINE PRN MG: 30 INJECTION, SOLUTION INTRAMUSCULAR at 01:39

## 2019-08-18 RX ADMIN — CEFAZOLIN SCH GM: 330 INJECTION, POWDER, FOR SOLUTION INTRAMUSCULAR; INTRAVENOUS at 14:00

## 2019-08-18 RX ADMIN — Medication SCH MG: at 19:34

## 2019-08-18 RX ADMIN — Medication SCH MG: at 08:00

## 2019-08-18 RX ADMIN — OXYCODONE HYDROCHLORIDE AND ACETAMINOPHEN PRN TAB: 5; 325 TABLET ORAL at 14:46

## 2019-08-18 RX ADMIN — IBUPROFEN SCH MG: 400 TABLET ORAL at 06:00

## 2019-08-18 RX ADMIN — DOCUSATE SODIUM PRN MG: 100 CAPSULE, LIQUID FILLED ORAL at 19:34

## 2019-08-18 RX ADMIN — OXYCODONE HYDROCHLORIDE AND ACETAMINOPHEN PRN TAB: 5; 325 TABLET ORAL at 19:35

## 2019-08-18 RX ADMIN — SODIUM CHLORIDE, SODIUM LACTATE, POTASSIUM CHLORIDE, AND CALCIUM CHLORIDE SCH MLS/HR: .6; .31; .03; .02 INJECTION, SOLUTION INTRAVENOUS at 02:53

## 2019-08-18 RX ADMIN — IBUPROFEN SCH MG: 400 TABLET ORAL at 14:46

## 2019-08-18 RX ADMIN — IBUPROFEN SCH MG: 400 TABLET ORAL at 22:45

## 2019-08-18 RX ADMIN — Medication SCH MG: at 17:00

## 2019-08-18 RX ADMIN — CEFAZOLIN SCH GM: 330 INJECTION, POWDER, FOR SOLUTION INTRAMUSCULAR; INTRAVENOUS at 06:07

## 2019-08-18 RX ADMIN — SODIUM CHLORIDE, SODIUM LACTATE, POTASSIUM CHLORIDE, AND CALCIUM CHLORIDE SCH MLS/HR: .6; .31; .03; .02 INJECTION, SOLUTION INTRAVENOUS at 12:41

## 2019-08-18 NOTE — PDOC
OB Progress Note


Date of Service


8/18/19


Time of Evaluation


1240


Notes


Pt. feeling well.  Pain controlled.


Lab





Laboratory Tests








Test


 8/17/19


07:28 8/17/19


12:21 8/18/19


04:30


 


Glucose (Fingerstick)


 118 mg/dL


(70-99) 


 





 


White Blood Count


 


 8.9 x10^3/uL


(4.0-11.0) 9.9 x10^3/uL


(4.0-11.0)


 


Red Blood Count


 


 3.57 x10^6/uL


(3.50-5.40) 3.48 x10^6/uL


(3.50-5.40)


 


Hemoglobin


 


 11.2 g/dL


(12.0-15.5) 11.0 g/dL


(12.0-15.5)


 


Hematocrit


 


 33.2 %


(36.0-47.0) 32.3 %


(36.0-47.0)


 


Mean Corpuscular Volume  93 fL ()  93 fL () 


 


Mean Corpuscular Hemoglobin  31 pg (25-35)  32 pg (25-35) 


 


Mean Corpuscular Hemoglobin


Concent 


 34 g/dL


(31-37) 34 g/dL


(31-37)


 


Red Cell Distribution Width


 


 13.8 %


(11.5-14.5) 13.6 %


(11.5-14.5)


 


Platelet Count


 


 188 x10^3/uL


(140-400) 169 x10^3/uL


(140-400)


 


Neutrophils (%) (Auto)  77 % (31-73)  76 % (31-73) 


 


Lymphocytes (%) (Auto)  19 % (24-48)  18 % (24-48) 


 


Monocytes (%) (Auto)  5 % (0-9)  7 % (0-9) 


 


Eosinophils (%) (Auto)  0 % (0-3)  0 % (0-3) 


 


Basophils (%) (Auto)  0 % (0-3)  0 % (0-3) 


 


Neutrophils # (Auto)


 


 6.8 x10^3/uL


(1.8-7.7) 7.5 x10^3/uL


(1.8-7.7)


 


Lymphocytes # (Auto)


 


 1.7 x10^3/uL


(1.0-4.8) 1.7 x10^3/uL


(1.0-4.8)


 


Monocytes # (Auto)


 


 0.4 x10^3/uL


(0.0-1.1) 0.7 x10^3/uL


(0.0-1.1)


 


Eosinophils # (Auto)


 


 0.0 x10^3/uL


(0.0-0.7) 0.0 x10^3/uL


(0.0-0.7)


 


Basophils # (Auto)


 


 0.0 x10^3/uL


(0.0-0.2) 0.0 x10^3/uL


(0.0-0.2)


 


Treponema pallidum Antibody


 


 Nonreactive


(Nonreactive) 











Laboratory Tests








Test


 8/18/19


04:30


 


White Blood Count


 9.9 x10^3/uL


(4.0-11.0)


 


Red Blood Count


 3.48 x10^6/uL


(3.50-5.40)


 


Hemoglobin


 11.0 g/dL


(12.0-15.5)


 


Hematocrit


 32.3 %


(36.0-47.0)


 


Mean Corpuscular Volume 93 fL () 


 


Mean Corpuscular Hemoglobin 32 pg (25-35) 


 


Mean Corpuscular Hemoglobin


Concent 34 g/dL


(31-37)


 


Red Cell Distribution Width


 13.6 %


(11.5-14.5)


 


Platelet Count


 169 x10^3/uL


(140-400)


 


Neutrophils (%) (Auto) 76 % (31-73) 


 


Lymphocytes (%) (Auto) 18 % (24-48) 


 


Monocytes (%) (Auto) 7 % (0-9) 


 


Eosinophils (%) (Auto) 0 % (0-3) 


 


Basophils (%) (Auto) 0 % (0-3) 


 


Neutrophils # (Auto)


 7.5 x10^3/uL


(1.8-7.7)


 


Lymphocytes # (Auto)


 1.7 x10^3/uL


(1.0-4.8)


 


Monocytes # (Auto)


 0.7 x10^3/uL


(0.0-1.1)


 


Eosinophils # (Auto)


 0.0 x10^3/uL


(0.0-0.7)


 


Basophils # (Auto)


 0.0 x10^3/uL


(0.0-0.2)








Medications





Current Medications


Ringer's Solution 1,000 ml @  125 mls/hr Q8H IV  Last administered on 8/18/19at 

02:53;  Start 8/15/19 at 12:41


Sodium Chloride (Normal Saline Flush) 3 ml QSHIFT  PRN IV AFTER MEDS AND BLOOD 

DRAWS;  Start 8/15/19 at 12:45


Ringer's Solution 500 ml @  500 mls/hr PRN 1X  PRN IV CALL MD IF GIVEN;  Start 

8/15/19 at 12:45


Ringer's Solution 1,000 ml @  125 mls/hr Q8H IV  Last administered on 8/15/19at 

17:38;  Start 8/15/19 at 12:41;  Stop 8/17/19 at 22:32;  Status DC


Betamethasone Sodium Phosphate (Celestone Soluspan) 12 mg Q24H IM  Last 

administered on 8/16/19at 13:51;  Start 8/15/19 at 12:45;  Stop 8/16/19 at 

12:46;  Status DC


Magnesium Citrate (Citroma) 296 ml 1X  ONCE PO  Last administered on 8/15/19at 

17:57;  Start 8/15/19 at 18:00;  Stop 8/15/19 at 18:01;  Status DC


Cefazolin Sodium/ Dextrose 50 ml @  100 mls/hr 1X PREOP  PRN IV PER PROTOCOL 

Last administered on 8/17/19at 14:33;  Start 8/17/19 at 14:00;  Stop 8/18/19 at 

18:00


Ringer's Solution 1,000 ml @  1,000 mls/hr Q1H PRN IV PER PROTOCOL;  Start 

8/17/19 at 14:15


Citric Acid/ Sodium Citrate (Bicitra) 30 ml 1X  ONCE PO  Last administered on 

8/17/19at 14:33;  Start 8/17/19 at 14:15;  Stop 8/17/19 at 14:18;  Status DC


Ondansetron HCl (Zofran) 4 mg STK-MED ONCE .ROUTE ;  Start 8/17/19 at 14:45;  

Stop 8/17/19 at 14:45;  Status DC


Oxytocin (Pitocin) 10 unit STK-MED ONCE .ROUTE ;  Start 8/17/19 at 14:45;  Stop 

8/17/19 at 14:45;  Status DC


Ephedrine Sulfate (ePHEDrine PF IN SALINE SYRINGE) 50 mg STK-MED ONCE IV ;  

Start 8/17/19 at 14:45;  Stop 8/17/19 at 14:45;  Status DC


Morphine Sulfate (Morphine Preservative Free) 10 mg STK-MED ONCE .ROUTE ;  Start

8/17/19 at 14:45;  Stop 8/17/19 at 14:45;  Status DC


Fentanyl Citrate (Fentanyl 2ml Vial) 100 mcg STK-MED ONCE .ROUTE ;  Start 

8/17/19 at 14:45;  Stop 8/17/19 at 14:45;  Status DC


Oxytocin (Pitocin) 10 unit STK-MED ONCE .ROUTE ;  Start 8/17/19 at 15:24;  Stop 

8/17/19 at 15:25;  Status DC


Oxytocin (Pitocin) 10 unit STK-MED ONCE .ROUTE ;  Start 8/17/19 at 15:24;  Stop 

8/17/19 at 15:25;  Status DC


Fentanyl Citrate (Fentanyl 2ml Vial) 100 mcg STK-MED ONCE .ROUTE ;  Start 

8/17/19 at 15:51;  Stop 8/17/19 at 15:51;  Status DC


Sodium Chloride (Normal Saline Flush) 3 ml QSHIFT  PRN IV AFTER MEDS AND BLOOD 

DRAWS;  Start 8/17/19 at 16:00


Oxytocin/Sodium Chloride 500 ml @  125 mls/hr CONT  PRN IV EXCESSIVE POST-PARTUM

BLEEDING;  Start 8/17/19 at 16:00;  Stop 8/17/19 at 23:59;  Status DC


Ibuprofen (Motrin) 800 mg Q8HRS PO ;  Start 8/17/19 at 22:00


Ondansetron HCl (Zofran) 4 mg PRN Q6HRS  PRN IV NAUSEA/VOMITING;  Start 8/17/19 

at 16:00


Docusate Sodium (Colace) 100 mg PRN BID  PRN PO HARD STOOLS;  Start 8/17/19 at 

16:00


Magnesium Hydroxide (Milk Of Magnesia) 2,400 mg PRN DAILY  PRN PO CONSTIPATION; 

Start 8/17/19 at 16:00


Al Hydroxide/Mg Hydroxide (Mylanta Plus Xs) 30 ml PRN Q4HRS  PRN PO HEARTBURN / 

GAS;  Start 8/17/19 at 16:00


Simethicone (Gas-X) 80 mg PRN AFTMEALHC  PRN PO GAS / BLOATING;  Start 8/17/19 

at 16:00


Diphenhydramine HCl (Benadryl Oral Elixir) 12.5 mg PRN Q6HRS  PRN PO ITCHING;  

Start 8/17/19 at 16:00


Ferrous Sulfate (Feosol) 325 mg BIDWMEALS PO ;  Start 8/17/19 at 17:00


Zolpidem Tartrate (Ambien) 5 mg PRN QHS  PRN PO INSOMNIA, MAY REPEAT X1;  Start 

8/17/19 at 16:00


Info (Do NOT chart on this placeholder) 1 ea PRN 1X  PRN MC SEE COMMENTS;  Start

8/17/19 at 16:00


Info (Do NOT chart on this placeholder) 1 ea PRN 1X  PRN MC SEE COMMENTS;  Start

8/17/19 at 16:00


Oxycodone/ Acetaminophen (Percocet 5/325) 1 tab PRN Q4HRS  PRN PO MILD PAIN 1-3;

 Start 8/17/19 at 16:00


Oxycodone/ Acetaminophen (Percocet 5/325) 2 tab PRN Q4HRS  PRN PO MODERATE PAIN,

SEVERE PAIN;  Start 8/17/19 at 16:00


Cefazolin Sodium 1 gm/Dextrose 50 ml @  100 mls/hr Q8HRS IV ;  Start 8/17/19 at 

22:00;  Status UNV


Cefazolin Sodium (Ancef) 1 gm Q8HRS IVP  Last administered on 8/18/19at 06:07;  

Start 8/17/19 at 22:00;  Stop 8/18/19 at 14:01


Ketorolac Tromethamine (Toradol 30mg Vial) 30 mg PRN Q6HRS  PRN IV PAIN Last 

administered on 8/18/19at 01:39;  Start 8/17/19 at 19:15;  Stop 8/22/19 at 19:14





Active Scripts


Active


Reported


Ranitidine Hcl 150 Mg Capsule 150 Mg PO BID


Exam


Abd: soft, mild tenderness, fundus firm


Prevena in place


Assessment


A: POD#1 s/p repeat c/s


Plan of Care:  Continue current Tx, Mgmt











DEEPA SALEH Jr, MD          Aug 18, 2019 12:43

## 2019-08-19 VITALS — DIASTOLIC BLOOD PRESSURE: 58 MMHG | SYSTOLIC BLOOD PRESSURE: 104 MMHG

## 2019-08-19 VITALS — DIASTOLIC BLOOD PRESSURE: 77 MMHG | SYSTOLIC BLOOD PRESSURE: 115 MMHG

## 2019-08-19 VITALS — DIASTOLIC BLOOD PRESSURE: 68 MMHG | SYSTOLIC BLOOD PRESSURE: 100 MMHG

## 2019-08-19 VITALS — SYSTOLIC BLOOD PRESSURE: 102 MMHG | DIASTOLIC BLOOD PRESSURE: 69 MMHG

## 2019-08-19 RX ADMIN — DOCUSATE SODIUM PRN MG: 100 CAPSULE, LIQUID FILLED ORAL at 08:34

## 2019-08-19 RX ADMIN — IBUPROFEN SCH MG: 400 TABLET ORAL at 22:18

## 2019-08-19 RX ADMIN — OXYCODONE HYDROCHLORIDE AND ACETAMINOPHEN PRN TAB: 5; 325 TABLET ORAL at 14:17

## 2019-08-19 RX ADMIN — IBUPROFEN SCH MG: 400 TABLET ORAL at 08:34

## 2019-08-19 RX ADMIN — IBUPROFEN SCH MG: 400 TABLET ORAL at 14:20

## 2019-08-19 RX ADMIN — DOCUSATE SODIUM PRN MG: 100 CAPSULE, LIQUID FILLED ORAL at 22:18

## 2019-08-19 RX ADMIN — IBUPROFEN SCH MG: 400 TABLET ORAL at 06:00

## 2019-08-19 RX ADMIN — OXYCODONE HYDROCHLORIDE AND ACETAMINOPHEN PRN TAB: 5; 325 TABLET ORAL at 05:34

## 2019-08-19 RX ADMIN — OXYCODONE HYDROCHLORIDE AND ACETAMINOPHEN PRN TAB: 5; 325 TABLET ORAL at 18:15

## 2019-08-19 NOTE — PDOC
Provider Note


Provider Note


Stable


Dressing CDI


FU in AM


Vital Sign - Last 24 Hours








 8/18/19 8/18/19 8/18/19 8/18/19





 10:10 17:12 19:35 19:40


 


Temp 97.8 98.3  





 97.8 98.3  


 


Pulse 76 78  


 


Resp 20 18  


 


B/P (MAP) 103/63 (76) 109/66 (80)  


 


Pulse Ox 95 98  


 


O2 Delivery   Room Air Room Air


 


    





    





 8/18/19 8/18/19 8/19/19 8/19/19





 20:33 22:49 05:34 05:45


 


Temp  97.5  98.3





  97.5  98.3


 


Pulse  78  78


 


Resp  14  14


 


B/P (MAP)  107/70 (82)  100/68 (79)


 


Pulse Ox  94  95


 


O2 Delivery Room Air Room Air Room Air Room Air


 


    





    





 8/19/19   





 06:36   


 


O2 Delivery Room Air   














Intake and Output   


 


 8/18/19 8/18/19 8/19/19





 14:59 22:59 06:59


 


Intake Total 220 ml 0 ml 750 ml


 


Output Total 2000 ml  


 


Balance -1780 ml 0 ml 750 ml

















MUSHTAQ POTTS MD               Aug 19, 2019 09:33

## 2019-08-20 VITALS — DIASTOLIC BLOOD PRESSURE: 83 MMHG | SYSTOLIC BLOOD PRESSURE: 113 MMHG

## 2019-08-20 VITALS — DIASTOLIC BLOOD PRESSURE: 72 MMHG | SYSTOLIC BLOOD PRESSURE: 112 MMHG

## 2019-08-20 RX ADMIN — DOCUSATE SODIUM PRN MG: 100 CAPSULE, LIQUID FILLED ORAL at 10:09

## 2019-08-20 RX ADMIN — OXYCODONE HYDROCHLORIDE AND ACETAMINOPHEN PRN TAB: 5; 325 TABLET ORAL at 10:10

## 2019-08-20 RX ADMIN — IBUPROFEN SCH MG: 400 TABLET ORAL at 10:09

## 2019-08-20 RX ADMIN — OXYCODONE HYDROCHLORIDE AND ACETAMINOPHEN PRN TAB: 5; 325 TABLET ORAL at 05:12

## 2019-08-20 RX ADMIN — OXYCODONE HYDROCHLORIDE AND ACETAMINOPHEN PRN TAB: 5; 325 TABLET ORAL at 16:18

## 2019-08-20 NOTE — PDOC3
OB DISCHARGE SUMMARY


DATE OF ADMISSION:  


19


DATE OF DISCHARGE:  


19


REASON FOR ADMISSION:  Other ([place])


PRENATAL PROCEDURES:  Ultrasound, Mgmt of OB Complications, Biophysicial Profile


INTRAPARTUM PROCEDURES:  : Low Cerv Trans


POSTPARTUM PROCEDURES:  None


POSTPARTUM OPERATIONS:  None


DISCHARGE DIAGNOSIS:  Term Pregnancy Delivered


DISCHARGE INFORMATION:  Activity, Diet


HOSPITAL COURSE


unremarkable


CONDITION AT DISCHARGE


Stable











MUSHTAQ POTTS MD               Aug 20, 2019 10:33

## 2019-08-20 NOTE — NUR
Discharge

Discharge instructions given to patient and  at this time per  phone. No 
questions or concerns noted. To follow up with DR Navarrete Friday 8/23/19 for Prevena dressing. 
Then to follow up with [facility] in 6 weeks.